# Patient Record
Sex: MALE | Race: WHITE
[De-identification: names, ages, dates, MRNs, and addresses within clinical notes are randomized per-mention and may not be internally consistent; named-entity substitution may affect disease eponyms.]

---

## 2023-05-17 ENCOUNTER — HOSPITAL ENCOUNTER (INPATIENT)
Dept: HOSPITAL 46 - ED | Age: 73
LOS: 3 days | Discharge: HOME | DRG: 177 | End: 2023-05-20
Attending: FAMILY MEDICINE | Admitting: INTERNAL MEDICINE
Payer: MEDICARE

## 2023-05-17 VITALS — DIASTOLIC BLOOD PRESSURE: 70 MMHG | SYSTOLIC BLOOD PRESSURE: 146 MMHG

## 2023-05-17 VITALS — HEIGHT: 69 IN | BODY MASS INDEX: 26.81 KG/M2 | WEIGHT: 181 LBS

## 2023-05-17 VITALS — DIASTOLIC BLOOD PRESSURE: 63 MMHG | SYSTOLIC BLOOD PRESSURE: 139 MMHG

## 2023-05-17 VITALS — DIASTOLIC BLOOD PRESSURE: 60 MMHG | SYSTOLIC BLOOD PRESSURE: 124 MMHG

## 2023-05-17 VITALS — DIASTOLIC BLOOD PRESSURE: 65 MMHG | SYSTOLIC BLOOD PRESSURE: 122 MMHG

## 2023-05-17 DIAGNOSIS — Z99.81: ICD-10-CM

## 2023-05-17 DIAGNOSIS — E11.65: ICD-10-CM

## 2023-05-17 DIAGNOSIS — Z88.2: ICD-10-CM

## 2023-05-17 DIAGNOSIS — J96.01: ICD-10-CM

## 2023-05-17 DIAGNOSIS — J15.6: Primary | ICD-10-CM

## 2023-05-17 DIAGNOSIS — N18.9: ICD-10-CM

## 2023-05-17 DIAGNOSIS — Z20.822: ICD-10-CM

## 2023-05-17 DIAGNOSIS — N17.9: ICD-10-CM

## 2023-05-17 DIAGNOSIS — E87.1: ICD-10-CM

## 2023-05-17 DIAGNOSIS — Z79.899: ICD-10-CM

## 2023-05-17 DIAGNOSIS — E11.22: ICD-10-CM

## 2023-05-17 DIAGNOSIS — E86.1: ICD-10-CM

## 2023-05-17 DIAGNOSIS — R14.0: ICD-10-CM

## 2023-05-17 DIAGNOSIS — D69.59: ICD-10-CM

## 2023-05-17 PROCEDURE — A9270 NON-COVERED ITEM OR SERVICE: HCPCS

## 2023-05-17 PROCEDURE — U0003 INFECTIOUS AGENT DETECTION BY NUCLEIC ACID (DNA OR RNA); SEVERE ACUTE RESPIRATORY SYNDROME CORONAVIRUS 2 (SARS-COV-2) (CORONAVIRUS DISEASE [COVID-19]), AMPLIFIED PROBE TECHNIQUE, MAKING USE OF HIGH THROUGHPUT TECHNOLOGIES AS DESCRIBED BY CMS-2020-01-R: HCPCS

## 2023-05-17 NOTE — NUR
SPOKE WITH PATIENT'S SISTER. PATIENT GIVES OK TO PROVIDE HER WITH INFORMATION.
SISTER, LISS, STATES PATIENT HAS A HISTORY OF DRINKING ALCOHOL, THOUGH [facility]
OF [place]. DR. BRITO NOTIFED. MONITOR FOR WITHDRAWAL. DR. BRITO STATES
PATIENT INFORMED HIM IT HAS BEEN 1 WEEK SINCE LAST DRINK. PATIENT USES CALL
LIGHT TO REQUEST BLANKETS. OXYGEN OFF. O2 SATS 86% ON ROOM AIR. OXYGEN
REPLACED AT 3L/MIN PER NC. AFTER A COUPLE MINUTES, SATS INCREASE TO 90%.

## 2023-05-17 NOTE — NUR
BED ALARM GOING OFF, pt FOUND AWAKE AND SITTING ON EDGE OF BED ATTEMPTING TO
GET OOB TO GO TO THE BATHROOM. pt EDUCATED ON USING CALL LIGHT, pt UP SBA TO
BATHROOM. pt VERBALIZED INTERST TO HAVE BM, UNSUCCESSFUL. pt BACK IN BED,
ALARM RESUMED. pt STATES, "I'M HOT THEN I GET COLD, THEN HOT AGAIN". TEMP
CHECKED, RESULT 97.9. 3LNC REMAINS IN PLACE, NO DISTRESS NOTED. CALL LIGHT IN
REACH. PRIMARY RN UPDATED AND PULLING EVENING MEDS NOW.

## 2023-05-17 NOTE — NUR
NEW BAG OF IV FLUIDS HUNG ON TUBING THAT IS GOOD UNTIL SUNDAY. VS DONE. PT IS
COMPLAINING OF A HEADACHE IN THE RIGHT TEMPLE BUT THINKS IT IS BECAUSE HE IS
HAVING VISUAL CHANGES. HE STATES HE HAD CATARACT SURGERY A YEAR AGO AND HE HAD
GREAT VISION THEN, NO GLASSES OR CONTACTS. SINCE HE GOT SICK OVER THIS LAST
WEEKEND, HE HAS STARTED TO HAVE BLURRED VISION. PT WAS GIVEN A HOT PACK FOR
HIS HEAD AND ADVISED WE WILL CHECK HIS EMAR FOR ANY MEDICATION FOR PAIN IF
REQUESTED. HE RATES HIS HEADACHE A 9 OUT OF 10, INTERMITTENT. CALL LIGHT IN
REACH.

## 2023-05-17 NOTE — NUR
Spoke with pt and he states he is retired and lives in [place] in a house a
3 steps. He does not have issues getting in or out of his home. He is a 
of 2 years. Pt's son lives in [place] and will assist him if needed. He does not
use any DME. He cooks, cleans, shops for himself. He was in [place] visit
when he became ill. He came to the ER on the 15 and was started on
antibiotics. He believes this is what caused his pnuemonia.  He plans on
returning home to [place] when he is cleared medically. He does not think he has
a pcp,but states he sees a Dr. at a Clinic in [place].  Pt denies needs and
plans on dc on dc to home. He will call his son to take him home if he is not
feeling well.

## 2023-05-17 NOTE — NUR
PT USES CALL LIGHT APPROPRIATELY TO CALL FOR ASSISTANCE TO THE BATHROOM. PT
NEEDS VERBAL CUES AND LINE/TUBE MANAGEMENT BUT IS ABLE TO AMBULATE WITHOUT
ISSUES. VOIDED 300ML KIRA COLORED, CLEAR, URINE INTO MALE URINAL. NO SOB OR
C/O PAIN WITH AMBULATION. PT RECEIVING 500ML FLUID BOLUS OF LR PER EMAR AND
RECEIVED UNISYN ABX IV. PT ASSISTED BACK TO BED, SCDS PLACED AND FUNCTIONING,
BEDSIDE TABLE, CELL PHONE, AND CALL LIGHT WITHIN REACH. DENIES FURTHER NEEDS
AT THIS TIME.

## 2023-05-17 NOTE — NUR
REPORT RECEIVED FROM DAY SHIFT RN. PT RESTING IN BED. NO DISTRESS NOTED. PT ON
3 L NC OF O2. SATING IN LOW 90'S. SAFETY PRECAUTIONS MAINTAINED. CALL LIGHT
ANDRES WOODRUFF. GRAZYNA CONTINUE TO MONITOR.

## 2023-05-17 NOTE — NUR
PT EXPRESSED CONCERN REGARDING THE FLUID BOLUS AND IV ABX HE RECEIVED. HE
STATES HIS "CHEST FEELS FULL" AND COMMENTED THAT HE DOESN'T "WANT TO DROWN".
PT SEEMS TO BE VERY ANXIOUS AND WORRIES ABOUT HAVING A REACTION TO IV ABX.
REASSURED PT THAT WE ARE MONITORING HIM AND ENCOURAGED HIM TO CONTINUE USING
HIS CALL LIGHT IF HE NOTICES ANY CHANGES OR ANYTHING UNUSUAL FOR HIMSELF. PT'S
LUNGS AND AIRWAY WERE CLEAR, NO DYSPNEA, NO SOB, NO SWELLING NOTED ABOUT THE
PT'S FACE, THROAT, OR ORAL CAVITY/TONGUE. CALL LIGHT AND BEDSIDE TABLE IN
REACH, 2 OF 4 SIDE RAILS UP. PT REQUESTED SCDS STAY OFF FOR NOW. MEAL TRAY AT
BEDSIDE, PT EATING DINNER. DENIES FURTHER NEEDS AT THIS TIME.

## 2023-05-17 NOTE — NUR
CALL LIGHT ANSWERED. OXYGEN TUBING ADJUSTED, REDNESS BEHIND RIGHT EAR FROM
TUBING. 3L OXYGEN BY NC IN PLACE. SBA TO STAND FOR VOID AT SIDE OF BED IN
URINAL. pt REQUESTING TO KEEP JEANS ON. BACK IN BED. IVF INFUSING WNL. CALL
LIGHT IN REACH. BED ALARM ON.

## 2023-05-17 NOTE — NUR
Called [facility] and [facility] in [place]. Both deny
seeing this pt.  They do not have any openings for follow up visits from a
hospital until mid to end of July. There is another  office in [place], Dr. Ortiz office. They are closed today. Will call tomorrow to see if they
have this pt.

## 2023-05-17 NOTE — NUR
PT ASSESSED AND MEDICATIONS GIVEN. PT STATED THAT THEY WERE ANXIOUS ABOUT
BEING IN THE HOSPITAL. REASSURANCE EDUCATION DONE ABOUT PLAN OF CARE. CIWA
DONE AND HAD A SCORE OF 2. IVF INFUSING. IV ABX GIVEN PER ORDER. IV SITE
INTACT. VSS. I&O DONE.
SAFETY PRECAUTIONS MAINTAINED. CALL LIGHT WITHIN REACH. WILL CONTINUE
TO MONITOR.

## 2023-05-18 VITALS — SYSTOLIC BLOOD PRESSURE: 142 MMHG | DIASTOLIC BLOOD PRESSURE: 62 MMHG

## 2023-05-18 VITALS — DIASTOLIC BLOOD PRESSURE: 63 MMHG | SYSTOLIC BLOOD PRESSURE: 134 MMHG

## 2023-05-18 VITALS — SYSTOLIC BLOOD PRESSURE: 120 MMHG | DIASTOLIC BLOOD PRESSURE: 56 MMHG

## 2023-05-18 VITALS — SYSTOLIC BLOOD PRESSURE: 141 MMHG | DIASTOLIC BLOOD PRESSURE: 75 MMHG

## 2023-05-18 VITALS — DIASTOLIC BLOOD PRESSURE: 65 MMHG | SYSTOLIC BLOOD PRESSURE: 132 MMHG

## 2023-05-18 VITALS — SYSTOLIC BLOOD PRESSURE: 130 MMHG | DIASTOLIC BLOOD PRESSURE: 64 MMHG

## 2023-05-18 NOTE — NUR
PT WAS RESTLESS AND ANXIOUS FOR MOST OF THE SHIFT. PLEASE SEE PREVIOUS NOTES
FOR UPDATES ON PT AND FAMILY INTERACTION THROUGHOUT SHIFT. PT ABLE TO AMBULATE
TO BATHROOM WITH CONTACT ASSISTENCE. O2 AT 3L NC REMAINED APPLIED TO PT
THROUGHOUT SHIFT. VSS. IVF INFUSING PER ORDER. SAFETY PRECAUTIONS MAINTAINED.
CALL LIGHT WITHIN REACH. WILL CONTINUE TO MONITOR.

## 2023-05-18 NOTE — NUR
THIS RN WAS INFORMED PTs SON CAME TO NURSES STATION TO ASK ABOUT PTs HOB BEING
LOWERED. THIS RN IN ROOM TO ROUND ON PT. HOB LOWERED. PT REPORTING HEADACHE
AND RATING PAIN 10/10. PT REQUESTING PRN PSEUDOEPHEDRINE. PRN PSEUDOEPHEDRINE
AND MOTRIN ADMINISTERED, SEE MAR.
PT DENIES ANY OTHER NEEDS AT THIS TIME. CALL LIGHT IN REACH. BED ALARM ON. SON
IN ROOM. SON AND PT EDUCATED ON CALL LIGHT USE.

## 2023-05-18 NOTE — NUR
REPORT RECEIVED FROM SOO LORA. PT RESTING IN BED WITH EYES CLOSED RR EVEN
AND UNLABORED. NO NEEDS IDENTIFIED AT THIS TIME. CALL LIGHT IN REACH. BED
ALARM ON.

## 2023-05-18 NOTE — NUR
pt AWAKE AND RESTING IN BED, CONTINUES TO FEEL ANXIOUS AND REPORT HEADACHE. pt
NOW ON PHONE WITH SON MAGALY, MAGALY ASKING ABOUT WHAT TYPE OF PNA pt HAS, SON
EDUCATED pt DIAGNOSED WITH ASPIRATION PNA. pt RE[place]TS [place] AND WAS GIVEN NAUSEA MEDS AND STATES, "I HAVEN'T FELT THE SAME SINCE".
SON STATES, "WELL HE WAS IN THE PASTURE CLEANING THE PEN, MAYBE HE BREATHED IN
SOME DUST OR POOP".
pt AND SON EDUCATED ASPIRATION PNA LIKELY OCCURS WHEN EATING OR
DRINKING. ANSWERED pt's SON'S QUESTIONS REGARDING TREATMENT AND POC. pt ASKED
IF HE HAS A PCP, pt DENIES INTEREST AND STATES, "OH I DON'T WANT THAT". pt
EDUCATED THAT PCP CAN HELP WITH OVERALL HEALTH AND HELP WITH STRATEGIES
PREVENTING ASPIRATION IN THE FUTURE. PRIMARY RN NOW IN ROOM TO GIVEN
ADDITIONAL PAIN AND ANXIETY MEDICATION.

## 2023-05-18 NOTE — NUR
THIS RN CALLED DR. RIVERA REGARDING PT REQUESTING BENADRYL AS PTs BACK IS
"ITCHY." NEW ORDERS RECEIVED VERIFIED WITH READ BACK. ORDER 25MG BENADRYL PO
ONCE. GIVE 12.5MG NOW AND IF NEEDED OKAY TO GIVE OTHER 12.5MG.

## 2023-05-18 NOTE — NUR
IN TO ROUND ON PT. AHNICA, CNA IN ROOM ASSISTING PT WITH TOILETING. PT
REPORTING BACK FEELING "ITCHY." PT REQUESTING PRN BENADRYL.
WILL CALL MD.

## 2023-05-18 NOTE — NUR
PHONE AT RN STATION ANSWERED, pt's SON SAMANTHA ON PHONE AND REPORTS THAT THE pt
CALLED HIM AND MADE STATEMENTS SUCH AS "THEY'RE TRYING TO KILL ME" AND THAT
THE pt REPORTS 10/10 PAIN R/T HEADACHE, RECENTLY GIVEN PRN TYLENOL BY PRIMARY
RN. pt's SON REASSURED THAT THE pt WAS GIVEN TYLENOL AND IF HEADACHE
CONTINUES, MD CAN BE CALLED FOR SOMETHING FOR PAIN AND/OR ANXIETY. SON STATES,
"CAN'T YOU GIVE HIM SOME ATIVAN OR SOMETHING TO RELAX". THIS RN IN ROOM TO
DISCUSS HEADACHE WITH pt AND THAT THE NURSE IS CALLING THE MD TO ASK FOR
ADDITIONAL PAIN MEDS. pt EDUCATED THAT STAFF MUST GIVE WHAT IS AVAILABLE (THE
TYLENOL) FIRST BEFORE CALLING MD. BED ALARM ON.PRIMARY RN ON PHONE WITH MD.

## 2023-05-18 NOTE — NUR
IN TO ADMINISTER BENADRYL, SEE MAR. PT TAKES PO MEDICATION WITH NO ISSUES.
PT DENIES ANY OTHER NEEDS AT THIS TIME. CALL LIGHT IN REACH. BED ALARM ON. SON
ON COUCH.

## 2023-05-18 NOTE — NUR
AMBULATED IN HALLWAY WITH OXYGEN. O2 SATS DOWN TO 89% ON 4L/MIN PER NC. RT
WITH PATIENT AS WELL. OXYGEN INCREASED TO 5L/MIN PER NC WITH AMBULATION.
RETURNS TO ROOM TO BED. SON REQUESTS PATIENT HAVE SUDAFED, INFORMED DOSE WAS
GIVEN AN HOUR AGO AND MAY BE GIVEN 6 HOURS FROM THAT DOSE. VERBALIZE
UNDERSTANDING.

## 2023-05-18 NOTE — NUR
PT HAS BEEN UP IN THE CHAIR FOR MOST OF THIS SHIFT. HE ATE ABOUT 50% OF HIS
BREAKFAST, AND ABOUT 25% OF HIS LUNCH. HE C/O THE AMOUNT OF CARBS IN HIS MEALS
AND HAS BEEN REFUSING HIS SLIDING SCALE INSULIN STATING THAT "THAT STUFF KILLS
YOU". HE HAS BEEN REQUESTING TO USE HIS PERSONAL LANCETS AND WAS TOLD THAT WE
MUST USE OUR MONITORS. HE REPORTED PAIN 10 OUT OF 10 THIS MORNING IN THE FORM
OF A HEADACHE ON THE RIGHT SIDE OF HIS HEAD (LATER DETERMINED, BY DR. BRITO,
TO BE A SINUS INFECTION) AND WAS GIVEN TYLENOL AND 30MG PRN SUDAFED. 2 HOURS
LATER HE REPORTED HIS PAIN AS "I DON'T KNOW, A 9?" WHEN ASKED IF IT WAS STILL
A 9, HE SAID, "I DON'T KNOW, I DON'T REALLY FEEL ANY PAIN RIGHT NOW". I
CONFIRMED, "YOU DON'T HAVE ANY PAIN RIGHT NOW?" AND HE SAID, "NO." HIS SON AND
MOTHER HAVE BEEN IN HIS ROOM FOR MOST OF THE DAY AND HAVE BEEN ENCOURAGING HIM
TO USE HIS INCENTIVE SPIROMETER. HE HAS BEEN AMBULATING THE HALLWAYS WITH
1-PERSON ASSIST X2 TODAY AND HIS O2 SATS RANGE (WHILE WALKING WITH O2 AT 4LPM)
FROM 89-92%. HE IS ON HUMIDIFIED O2 NOW PER DR. BRITO, FOR CONGESTION. PT
STILL SEEMS PRETTY ANXIOUS AND WAS ENCOURAGED TO PARTICIPATE IN HIS HEALING BY
USING THE IS, AMBULATING, USING THE ACAPELLA, AND FOLLOWING DIRECTIONS (EATING
WELL, HYDRATING, GETTING SLEEP, RATING PAIN) IN ORDER TO BE DISCHARGED AS SOON
AS HE CAN BE. PT HAS BEEN SLEEPING FOR THE LAST 3 HOURS.

## 2023-05-18 NOTE — NUR
PT'S SISTER, LISS, CALLED FOR AN UPDATE ON PT. PT GAVE PERMISSION FOR RN TO
TALK TO LISS AND GIVE UPDATES. RN UPDATED LISS ON SHIFT EVENTS. LISS
VERBALIZED UNDERSTANDING.

## 2023-05-18 NOTE — NUR
BED ALARM GOING OFF, pt UP SBA TO BATHROOM TO VOID AND BACK TO BED, STEADY ON
FEET. VOIDED VIA URINAL, BED ALARM RESUMED AND CALL LIGHT IN REACH. 3LNC
REMAISN IN PLACE, pt REPROTS PAIN R/T HEADACHE. COOL RAG APPLIED. PRIMARY RN
UPDATED.

## 2023-05-18 NOTE — NUR
PT UP IN CHAIR, SON AT BEDSIDE. MEAL TRAY IN ROOM. PT ADVISED THAT HIS CBG WAS
344 THIS TIME AND HE STATED HE DOES NOT WANT INSULIN STILL BUT THAT HE WILL GO
FOR WALKS TO HELP LOWER HIS BLOOD GLUCOSE. THE PT WAS ON AND HAD A REGULAR
DIET TRAY THIS MORNING FOR BREAKFAST BUT IS NOW, FOR LUNCH, ON A 60G CARB
DIET. PT WAS RE-EDUCATED ON THE IMPORTANCE OF FOLLOWING INSTRUCTIONS TO USE
THE IS AND ACAPELLA, STAYING HYDRATED, AND GETTING PLENTY OF SLEEP. PT
VERBALIZED UNDERSTANDING. PT EXPRESSED DESIRE TO AMBULATE THE HALLWAYS TO HELP
LOWER HIS CBG. PORTABLE O2 TAKEN WITH PT BY RN, PT TOLERATED WALK WELL, AND
WENT BACK TO HIS CHAIR, LLE ON PILLOW. CALL LIGHT IN REACH

## 2023-05-18 NOTE — NUR
RECEIVED REPORT FROM NOC SHIFT RN. PT SUPINE IN BED, HOB ELEVATED, RESTING
WITH EYES OPEN. PT REPORTS HEADACHED 10 OUT OF 10, STATES THAT HE STILL
DOESN'T FEEL GOOD. PT'S FAMILY SHOULD BE ARRIVING TODAY TO VISIT. UNASYN IV
HANGING AND STARTED. PT REQUESTS A NEW WATER CUP AS HE THINKS HIS IS LEAKING.
PERSONAL BELONGINGS, BEDSIDE TABLE, AND CALL LIGHT IN REACH. NO OTHER NEEDS AT
THIS TIME.

## 2023-05-18 NOTE — NUR
DR. BRITO IN WITH PT AT 1243, SON HAD STEPPED OUT TO  PT'S MOTHER. PT
UP IN CHAIR. PT WAS ADVISED THAT HIS BLURRY VISION MAY BE FROM HIS DIABETES
AND WAS ENCOURAGED TO TAKE INSULIN IF HE IS WILLING, WHILE HE IS IN THE
HOSPITAL. PT WOULD LIKE TO SPEAK WITH HIS SON ABOUT IT FIRST. WILL ADVISE HIS
SON OF DR. BRITO'S ANSWERS REGARDING HEADACHE (POSSIBLE SINUS INFECTION). PT
LEGS ELEVATED IN CHAIR FOR COMFORT. PT ATE APPROX 10% OF HIS LUNCH AND
COMPLAINED THAT IT HAS TOO MANY CARBS FOR HIS LIKING, AND THAT HE SOMETIMES
EATS ONLY 20G OF CARBS A DAY. CALL LIGHT IN REACH, DENIES FURTHER NEEDS AT
THIS TIME. PER DR. BRITO, WILL S/L PT AFTER THIS BAG OF FLUIDS RUNS THROUGH.

## 2023-05-18 NOTE — NUR
Assisted Pt with going to and from the bathroom and using the bathroom. Pt
noted red itchy spots on back; notified RN.
Repositoned Pt in bed with RN. Refilled Pt's ice pack and water with fresh
ice.
Call light left in
reach; bed alarm on. No other needs expressed by Pt.

## 2023-05-18 NOTE — NUR
PT RESTING WITH EYES CLOSED. NO DISTRESS NOTED.
PT'S SON, MAGALY, CALLED ANF UPDATED ON PT'S
STATUS. SAFETY PRECAUTIONS MAINTAINED. CALL LIGHT WITHIN REACH. WILL CONTINUE
TO MONITOR.

## 2023-05-18 NOTE — NUR
PT STATES HE HAS A 10/10 HA, PRESSURE. PAIN MED AND CONGESTION MED PROVIDED.
NO OTHER NEEDS AT THIS TIME. SON IN ROOM. CALL LIGHT IN REACH.

## 2023-05-18 NOTE — NUR
PT STATED THAT THEY HAVE A BAD HA. PRN TYLENOL GIVEN. PT STATED THAT IT WASN'T
WORKING AND WANTED SOMETHING ELSE. PT CALLED THEIR SON, MAGALY, WHO LIVES IN
[place] AND STATED THAT WE WERE TRYING TO KILL HIM. SONMAGALY CALLED AND TALKED
TO PRIMARY RN AND CHARGE RN VIA PHONE AND ASKED THAT WE CALL AND GET SOMETHING
ADDITIONAL FOR PAIN. DR BRITO CALLED AND NORCO AND SEROQUEL ORDERED. PT CALLED
SONMAGALY, AGAIN STATING WE WERE NOT DOING ANYTHING FOR HIM. PT EDUCATED AND
INFROMED THAT WE WERE IN TOUCH WITH THE DOCTOR AND GETTING SOMETHING FOR THEM.
PT IS REFUSING OTHER INTERVENTIONS FOR THEIR HA AT THIS TIME. WAITING FOR
MEDICATIONS TO BE VERIFIED. WILL CONTINUE TO MONITOR.

## 2023-05-18 NOTE — NUR
Pt's O2 was 86% on 3 liters nasal cannula. This student nurse spoke with
charge nurse Rekha who recommended increasing his O2 to 4 lpm which was done.
See vitals for response.

## 2023-05-18 NOTE — NUR
pt SLEEPING, AWAKENS TO VOICE FOR LAB. pt DROWSY. 1PA TO STAND AT SIDE OF BED
FOR VOID IN URINAL. IVF INFUSING WNL. pt BACK IN BED. ICE WATER REFILLED AND
IN REACH. CALL LIGHT NEXT TO pt. 3L OXYGEN IN PLACE. SPO2 WNL. VSS. BED ALARM
ON. pt COOPERATIVE WITH CARES.

## 2023-05-18 NOTE — NUR
PT IS NOW ON ACCUCHECKS AND SLIDING SCALE. BREAKFAST TRAY AT BEDSIDE. PT CBG
, HE REFUSED 3 UNITS OF INSULIN, STATING HE DOESN'T NEED IT AT HOME
BECAUSE HE CONTROLS HIS DIET AND DOESN'T EAT SUGARY STUFF OR A LOT OF CARBS,
AND HE SKIPS BREAKFAST. PT WAS ENCOURAGED TO GET OUT OF BED TODAY AND MOVE
AROUND/TAKE WALKS WITH STAFF TO ENCOURAGE AMBULATION AND HEALING. PT
VERBALIZED UNDERSTANDING. PT WAS GIVEN HIS 0900 PO ABX, AND 500MG
ACETAMINOPHEN FOR 10/10 HEADACHE IN THE RIGHT TEMPLE. [facility] NURSING STUDENT,
TREVIN, BROUGHT THE PATIENT SOME COLD GREEN TEA AT HIS REQUEST, INSTEAD OF HIS
COFFEE WITH BREAKFAST. CALL LIGHT IN REACH, PT DENIES FURTHER NEEDS AT THIS
TIME.

## 2023-05-18 NOTE — NUR
SON MAGALY IN ROOM AND ASSISTED WITH MAKING THE BED AS HE PLANS ON STAYING THE
NIGHT WITH HIS FATHER. POC DISCUSSED WITH MAGALY, PRIMARY RN ZAC ALSO IN
ROOM COMPLETING MED PASS. NO ADDITIONAL NEEDS OR CONCERNS VERBALIZED. CALL
LIGHT IN REACH.

## 2023-05-18 NOTE — NUR
HOURLY ROUNDING ON PT. RESTING IN BED WITH EYES CLOSED, PT'S SON AT BEDSIDE.
ANSWERED QUESTIONS AND ENCOURAGED PT'S SON AND PT TO DISCUSS CONCERNS WITH DR. BRITO WHEN HE COMES IN. PT REPORTS HE STILL HAS PAIN 10 OUT OF 10 IN HIS RIGHT
TEMPLE, COOL COMPRESS GIVEN. SON EXPRESSED CONCERN ABOUT THE NEED FOR AN MRI
OR OTHER DIAGNOSTIC IMAGING TO LOOK FOR ANEURYSMS. CHANTEL [place] ASSISTED PT TO
CHAIR FROM BED, STATES HE FEELS WARM. . CALL LIGHT IN REACH.

## 2023-05-18 NOTE — NUR
pt's SISTER LISS CM CALLED AND ASKED FOR pt UPDATE. pt ASKED IF UPDATE
COULD BE PROVIDED, pt OKAY'ED. PRIMARY RN SPEAKING WITH pt's SISTER NOW VIA
PHONE.

## 2023-05-18 NOTE — NUR
NURSING STUDENT NEETU. NOTIFIES THIS NURSE PATIENT IS BLEEDING FROM IV SITE.
UPON ENTERING ROOM, PATIENT AMBULATING WITHOUT ASSISTANCE, BLOOD DROPLETS
NOTED ON THE FLOOR. IV CATHETER REMAINS INTACT, DRESSING INTACT. APPEARS
PATIENT PULLED IV TUBING APART AND WAS BLEEDING THROUGH TUBING ONTO FLOOR. NO
OXYGEN ON AT THIS TIME. STATES HE NEEDS TO URINATE. OXYGEN REPLACED, AMBULATES
TO BATHROOM WITH NURSING STUDENT. IV TUBING DISCONNECTED AND SITE FLUSHED WITH
10 ML NS. NEW TUBING CONNECTED TO IVF. PATIENT RETURNS TO RECLINER, CHAIR
ALARM ACTIVATED. IV FLUIDS RESTARTED.

## 2023-05-18 NOTE — NUR
BENADRYL TABLETS NOT AVAILABLE, ONLY CAPSULES. DR RIVERA UPDATED, PER MD,
VERBAL ORDER READ BACK OKAY TO GIVE THE FULL 25MG PO BENADRYL CAPSULE. PRIMARY
RN LEIGH UPDATED.

## 2023-05-18 NOTE — NUR
IN TO ADMINISTER MEDICAITONS, SEE MAR.
PT TOLERATED SUB-Q INJECTION WELL.
PT RESTING IN BED AND RESPONDS WHEN ADDRESSED. SON IN ROOM TO VISIT.
VITLAS AND I&Os COMPLETE.
ASSESSMENT COMPLETE. LUNG SOUNDS CLEAR IN RUL, FLORENCIO AND LLL. DIMINISHED IN RLL.
BOWEL TONES ACTIVE. PT DENIES PAIN AT THIS TIME.
PT REQUESTING MILK. MILK PROVIDED.
LOTION TO PTs BACK AS PT REPORTING BACK BEING ITCHY.
SON REQUESTING CHAP STICK FOR PT. WILL RETURN WITH CHAP STICK.
PT REQUESTING WATER, WILL RETRUN WITH WATER.
PT IN BED WITH BED ALARM ON. CALL LIGHT IN REACH.

## 2023-05-19 VITALS — SYSTOLIC BLOOD PRESSURE: 128 MMHG | DIASTOLIC BLOOD PRESSURE: 67 MMHG

## 2023-05-19 VITALS — DIASTOLIC BLOOD PRESSURE: 72 MMHG | SYSTOLIC BLOOD PRESSURE: 146 MMHG

## 2023-05-19 VITALS — DIASTOLIC BLOOD PRESSURE: 72 MMHG | SYSTOLIC BLOOD PRESSURE: 156 MMHG

## 2023-05-19 VITALS — SYSTOLIC BLOOD PRESSURE: 139 MMHG | DIASTOLIC BLOOD PRESSURE: 65 MMHG

## 2023-05-19 VITALS — DIASTOLIC BLOOD PRESSURE: 75 MMHG | SYSTOLIC BLOOD PRESSURE: 137 MMHG

## 2023-05-19 VITALS — SYSTOLIC BLOOD PRESSURE: 126 MMHG | DIASTOLIC BLOOD PRESSURE: 70 MMHG

## 2023-05-19 NOTE — NUR
PT. SON BROUGHT PT'S EYE DROPS AND YOGURT. YOGURT LABELED AND IN FRIDGE.
EYE DROPS GIVEN TO PHARMACY FOR LABELING.

## 2023-05-19 NOTE — NUR
PT I&O'S TAKEN/DOCUMENTED. WATER REFILLED, PT IN CHAIR, LEGS DANLGED, CALL
LIGHT ON ARM REST. NO OTHER NEEDS AT THIS TIME.

## 2023-05-19 NOTE — NUR
PT CONCERNED IV SITE "LOOKED DIRTY" UNDER DRESSING. SITE WNL. PT THEN
CONCERNED TUBING KINKING. TUBING TAPED DOWN. DENIES FURTHER NEEDS.

## 2023-05-19 NOTE — NUR
SON ON COUCH. SON SITS UP WHEN DOOR OPENS. NO NEEDS REPORTED AT THIS TIME.
CALL LIGHT IN REACH. BED ALARM ON.

## 2023-05-19 NOTE — NUR
IN AS BED ALARM ALARMING. PT SITTING ON EDGE OF BED. PT REPORTS TOILETING
NEEDS. SBA FROM BED TO RESTROOM AND BACK TO BED. PT HAS SLOW STEADY GAIT.
LINENS ON BED CHANGED. NEW GOWN PROVIDED.
VITALS AND I&Os COMPLETE.
ASSESSMENT COMPLETE. LUNG SOUNDS CLEAR IN RUL AND FLORENCIO. DIMINISHED IN RLL.
COARSE IN RLL. BOWEL TONES ACTIVE. HEART RATE TACHYCARDIC. PT DENIES PAIN AT
THIS TIME.
PT REQUESTING ICE. ICE PROVIDED.
PT DENIES ANY OTHER NEEDS AT THIS TIME. CALL LIGHT IN REACH. BED ALARM ON. SON
ON COUCH.

## 2023-05-19 NOTE — NUR
IN AS BED ALARM ALARMING, RESOLVED. PT REQUESTING TOILETING. SPOT CHECK PTs O2
SATS. PT SITTING ON EDGE OF BED AND O2 SATS AT 85% ON RA. PLACED 2L OF O2 VIA
NC ON PT. PT AMBULATES TO RESTROOM AND BACK TO BED. O2 SATS AT 85% ON 2L NC.
INCREASED TO 3L NC. PT RECOVERS AND O2 SATS INCREASE TO 94% ON 3L AND
MAINTAIN. TITRATED PT TO 1L NC. PT MAINTAINS AT 90% ON 1L NC. EDUCATED PT ON
O2 AND USE OF IS OR ACAPELLA. PT AND SON VERBALIZE UNDERSTANDING. COOL CLOTH
PROVIDED FOR PT. FAN PROVIDED FOR PT. PT REFUSES SCDs. PT DENIES ANY OTHER
NEEDS AT THIS TIME. CALL LIGHT IN REACH. BED ALARM ON. SON IN ROOM.

## 2023-05-19 NOTE — NUR
IN TO ROUND ON PT. SOO UMAÑA IN [employer] PT ASSISTING PT TO BED.
PT REPORTING HEADACHE 10/10. PRN MOTRIN ADMINISTERED, SEE MAR. PT TAKES PO
MEDICAITON WITH NO ISSUES.
ASSESSMENT COMPLETE. LUNG SOUNDS CLEAR IN RUL AND FLORENCIO. DIMINISHED IN RLL AND
LLL. COARSE IN LLL. BOWEL TONES ACTIVE.
PT REQUESTING TO USE EYE DROPS. EYE DROPS ADMINISTERED, SEE MAR.
PT REQUESTING COOL CLOTH. COOL CLOTH PROVIDED FOR PTs FOREHEAD.
PT REQUESTING PILLOW. PILLOW PROVIDED.
ASKED PT WHAT THE DATE IS PT STATES "THE 18TH." INFORMED PT IT IS THE 19tH.
ASKED PT WHAT MONTH IT IS PT STATES "MARCH." INFORMED PT IT IS MAY. ASKED PT
WHAT YEAR IT IS PT STATES "21." ASKED PT WHY THE PT IS IN THE HOSPITAL PT
STATES "I HAD STOMACH PROBLEMS." PT A&O TO SELF.
PT DENIES ANY OTHER NEEDS AT THIS TIME. CALL LIGHT IN REACH.

## 2023-05-19 NOTE — NUR
pt walked to the bathroom, sba, w/o difficulty. this rn remains in room. back
in bed. call light in reach. denies further needs.

## 2023-05-19 NOTE — NUR
IN AS PUMP ALARMING, RESOLVED. IV ABX COMPLETE. PT SL. PT RESTING IN BED
SEMI-FOWLERS. RR EVEN AND UNLABORED. EYES CLOSED.
PT ALLOWED TO REST AT THIS TIME. CALL LIGHT IN REACH. NO OTHER NEEDS
IDENTIFIED AT THIS TIME. SON ON COUCH.
BED ALARM ON.

## 2023-05-19 NOTE — NUR
REPORT RECEIVED FROM NIGHT RN AND PT. CARE RESUMED. PT. C/O SINUS PAIN AND
ADMIN. PRN. MED. ON 4L O2 NC AND SP02 IS 92%. SON AT BEDSIDE. ASSESSMENT
COMPLETED AND MEDS ADMIN. CALL LIGHT IN REACH.

## 2023-05-19 NOTE — NUR
SON TO NURSES STATION. SON REPORTS PT IS MOVING AROUND IN BED AND IS
"UNCOMFORTABLE." IN WITH CHANTEL HENNING TO BOOST PT IN BED. PT REPOSITIONED. WARM
CLOTH PROVIDED FOR PTs EYES. PRN SEROQUEL ADMINISTERED, SEE MAR. PER PT
REQUEST. ABX STARTED, SEE MAR.
PT DENIES ANY OTHER NEEDS AT THIS TIME. CALL LIGHT IN REACH. BED ALARM ON.

## 2023-05-19 NOTE — NUR
SPOKE TO PATIENT ABOUT THIS DISCHARGE PLAN. PATIENT PLANS TO GO HOME TO [place]
WHERE HE LIVES.PATIENT HAS A SON MAGALY THAT WILL HELP IF NEEDED.

## 2023-05-19 NOTE — NUR
REPORT RECEIVED FROM SOO CARRASCO. PT RESTING IN BED LAYING SEMI-FOWLERS. EYES
CLOSED. RR EVEN AND UNLABORED. PT AWAKENS WHEN DOOR OPENS. PT DENIES ANY NEEDS
AT THIS TIME. CALL LIGHT IN REACH. BED ALARM ON.

## 2023-05-19 NOTE — NUR
IN TO ROUND ON PT. BG CHECK. PT REFUSES INSULIN. PT REQUESTING TO GO FOR WALK.
IV ABX COMPLETE. PT SL. PT GETS UP FROM BED BY SELF AND AMBULATES [place].
LEEROY RN AMBULATES LADD WITH PT. SON ACCOMPANIES PT. NO OTHER NEEDS FROM
THIS RN AT THIS TIME.

## 2023-05-20 VITALS — DIASTOLIC BLOOD PRESSURE: 69 MMHG | SYSTOLIC BLOOD PRESSURE: 145 MMHG

## 2023-05-20 VITALS — DIASTOLIC BLOOD PRESSURE: 73 MMHG | SYSTOLIC BLOOD PRESSURE: 152 MMHG

## 2023-05-20 VITALS — DIASTOLIC BLOOD PRESSURE: 61 MMHG | SYSTOLIC BLOOD PRESSURE: 139 MMHG

## 2023-05-20 NOTE — NUR
IN AS IV PUMP ALARING, RESOLVED. IV ABX COMPLETE. PT SL. PT RESTING IN BED
SEMI-FOWLERS. RR EVEN AND UNLABORED. PT AWAKENS WHEN BEING DISCONNECTED FROM
PUMP AND THEN CLOSES EYES AGAIN.
NO NEEDS IDENTIFIED AT THIS TIME. CALL LIGHT IN REACH. BED ALARM ON. SON ON
COUCH.

## 2023-05-20 NOTE — NUR
patient up to restroom before breakfast, SBA, patient also refused acu check.
family in room. call light within reach.

## 2023-05-20 NOTE — NUR
IN WITH PRN PAIN MEDICATION PT IS REQUESTING. PRN PAIN MEDICATION AND
PSEUDOPHEDRINE ADMINISTERED, SEE MAR. PT TAKES PO MEDICATION WITH NO ISSUES.
ICE WATER PROVIDED. COOL CLOTH TO PTS FOREHEAD. PT DENIES ANY OTHER NEEDS AT
THIS TIME. CALL LIGHT IN REACH. BED ALARM ON. SON IN ROOM.

## 2023-05-20 NOTE — NUR
IN TO ADMINISTER ABX, SEE MAR. PT RESTING IN BED. PT AWAKENS WHEN DOOR IS
OPEN. RR EVEN AND UNLABORED. IV ABX STARTED, SEE MAR. PT DENIES ANY NEEDS AT
THIS TIME. CALL LIGHT IN REACH. BED ALARM ON. SON ON COUCH.

## 2023-05-20 NOTE — NUR
PT'S SON, MAGALY, CAME TO THE NURSES STATION STATING THAT HIS HAD WANTS THE
OXYGEN VIA NC TAKEN OFF. PT IS ON 1L NS TO KEEP HIS SATS ABOVE 90%. SAT
READING WITH 1L NC WAS 91%. PT WAS VERY INSISTENT ON REFUSING ANYMORE OXYGEN
STATING IT WAS KILLING HIM. OXYGEN WAS REMOVED PER PT REQUEST. EDUCATION WAS
GIVEN ON THE NEED FOR OXYGEN AND IT PURPOSE. PT STILL REFUSED TO HAVE IT.
PULSE OX LEFT ON PT FINGER TO MONITOR O2 LEVELS. WILL CONTINUE TO MONITOR.

## 2023-05-20 NOTE — NUR
IN TO ROUND ON PT. PT RESTING IN BED AND LOOKDS OVER AT DOOR WHEN DOOR OPENS.
SOO UMAÑA IN TO ASSIST WITH BOOSTING PT UP IN BED. ASKED PT IF PT IS HAVING
ANY PAIN AND PT STATES "I DO NO KNOW." ASKED PT IT PT STILL HAS A HEADACHE AND
PT STATES "NO." INFORMED PT TO USE CALL LIGHT IF PT DECIDES ON PAIN
MEDICATION. PT DENIES ANY OTHER NEEDS AT THIS TIME. CALL LIGHT IN REACH. BED
ALARM ON. SON IN ROOM.

## 2023-05-20 NOTE — NUR
THIS RN CALLED DR. RIVERA TO UPDATE HIM ON PT. AND PTs REQUEST FOR MEDICATION
FOR ANXIETY. NEW ORDER RECEIVED FOR ATIVAN 0.5MG PO ONCE. VERIFIED WITH READ
BACK.

## 2023-05-20 NOTE — NUR
IN TO ADMINISTER ONCE TIME DOSE OF ATIVAN, SEE MAR. PT TAKES PO MEDICATION
WITH NO ISSUES. PT REPORTING PAIN 10/10 HEADACHE. OFFERED PSEUDOEPHEDRINE AND
PRN PAIN MEDICATION. PT ACCEPTS. WILL RETURN WITH PRN MEDICATIONS AND WATER.
NO OTHER NEEDS AT THIS TIME. CALL LIGHT IN REACH. BED ALARM ON.

## 2023-05-20 NOTE — NUR
PT REQUESTED TO AMBULATE IN THE HALLS. THIS RN WALKED PT ON A COMPLETE CIRCUIT
DOWN THE LADD TO CRITICAL CARE AND BACK AROUND TO THE PT'S ROOM. HIS OXYGEN
SATURATION AFTER THE WALK WAS 89 % PULSE . PT IS REFUSING SUPPLEMENTAL
OXYGEN.

## 2023-05-20 NOTE — NUR
IN TO ANSWER CALL LIGHT. PT REPORTS WANTING TO TALK TO THE DOCTOR. INFORMED PT
THIS RN WOULD LET THE DOCTOR KNOW PT WOULD LIKE TO TALK WITH HIM. PT ALSO
REQUESTING SOMETHING FOR ANXIETY. ASKED PT IF PT TAKES ANY MEDICATIONS FOR
ANXIETY AT HOME. PT STATES "NO." INFORMED PT THIS RN WILL CALL DOCTOR AND ASK.
PT DENIES ANY OTHER NEEDS AT THIS TIME. CALL LIGHT IN REACH. BED ALARM ON.

## 2023-05-20 NOTE — NUR
IN TO ANSWER CALL LIGHT. PT REQUESTING TOILETING. SBA FROM BED TO RESTROOM AND
BACK TO BED. O2 SATS AT 84% ON 2L NC WHEN PT IS SITTING IN BED AFTER
AMBULATING. PT USES ACAPELLA AND O2 SATS INCREASE TO 92%. TITRATED PT BACK TO
1L NC AND PT MAINTAINS AT 90% ON 1L O2 VIA NC. PT LAYS BACK IN BED. PT STATES
"I WANT TO TALK WITH THE DOCTOR TODAY, I THOUGHT I WAS SUPPOSED TO BE OFF O2
AND I AM SUPPOSED TO GO HOME TODAY." EDUCATED PT ON O2 AND USE OF ACAPELLA.
INFORMED PT THE MD WILL BE IN TO SEE HIM TODAY. PT VERBALIZES UNDERSTANDING.
ASSESSMENT COMPLETE. LUNG SOUNDS CLEAR IN RUL AND FLORENCIO. DIMINISHED IN RLL AND
LLL. COARSE IN LLL. PT DENIES PAIN AT THIS TIME. BOWEL TONES ACTIVE. PT
REQUESTING ICE WATER. ICE WATER PROVIDED.  PT DENIES ANY OTHER NEEDS AT THIS
TIME. CALL LIGHT IN REACH. BED ALARM ON.